# Patient Record
Sex: FEMALE | Race: OTHER | Employment: OTHER | URBAN - METROPOLITAN AREA
[De-identification: names, ages, dates, MRNs, and addresses within clinical notes are randomized per-mention and may not be internally consistent; named-entity substitution may affect disease eponyms.]

---

## 2022-07-26 ENCOUNTER — COMPREHENSIVE EXAM (OUTPATIENT)
Dept: URBAN - METROPOLITAN AREA CLINIC 16 | Facility: CLINIC | Age: 57
End: 2022-07-26

## 2022-07-26 ENCOUNTER — PREPPED CHART (OUTPATIENT)
Dept: URBAN - METROPOLITAN AREA CLINIC 16 | Facility: CLINIC | Age: 57
End: 2022-07-26

## 2022-07-26 DIAGNOSIS — H00.025: ICD-10-CM

## 2022-07-26 DIAGNOSIS — H04.123: ICD-10-CM

## 2022-07-26 DIAGNOSIS — H00.14: ICD-10-CM

## 2022-07-26 PROCEDURE — 99213 OFFICE O/P EST LOW 20 MIN: CPT

## 2022-07-26 RX ORDER — TOBRAMYCIN AND DEXAMETHASONE 3; 1 MG/G; MG/G: 1/2 OINTMENT OPHTHALMIC

## 2022-07-26 RX ORDER — CEPHALEXIN 500 MG/1: 1 CAPSULE ORAL TWICE A DAY

## 2022-07-26 ASSESSMENT — KERATOMETRY
OS_K2POWER_DIOPTERS: 45.00
OS_AXISANGLE_DEGREES: 140
OS_AXISANGLE2_DEGREES: 50
OD_AXISANGLE_DEGREES: 12
OD_AXISANGLE2_DEGREES: 102
OD_K1POWER_DIOPTERS: 44.75
OS_K1POWER_DIOPTERS: 45.75
OD_K2POWER_DIOPTERS: 44.50

## 2022-07-26 ASSESSMENT — VISUAL ACUITY
OD_SC: 20/20-1
OU_SC: 20/20
OS_SC: 20/25-1

## 2022-07-26 ASSESSMENT — TONOMETRY
OD_IOP_MMHG: 20
OS_IOP_MMHG: 18

## 2022-08-09 ENCOUNTER — COMPREHENSIVE EXAM (OUTPATIENT)
Dept: URBAN - METROPOLITAN AREA CLINIC 16 | Facility: CLINIC | Age: 57
End: 2022-08-09

## 2022-08-09 DIAGNOSIS — H00.14: ICD-10-CM

## 2022-08-09 DIAGNOSIS — H04.123: ICD-10-CM

## 2022-08-09 DIAGNOSIS — H52.4: ICD-10-CM

## 2022-08-09 DIAGNOSIS — H40.013: ICD-10-CM

## 2022-08-09 PROCEDURE — 92133 CPTRZD OPH DX IMG PST SGM ON: CPT

## 2022-08-09 PROCEDURE — 92015 DETERMINE REFRACTIVE STATE: CPT

## 2022-08-09 PROCEDURE — 92014 COMPRE OPH EXAM EST PT 1/>: CPT

## 2022-08-09 ASSESSMENT — KERATOMETRY
OS_AXISANGLE_DEGREES: 140
OS_AXISANGLE2_DEGREES: 50
OD_AXISANGLE_DEGREES: 26
OS_K1POWER_DIOPTERS: 45.75
OS_K2POWER_DIOPTERS: 44.75
OD_K2POWER_DIOPTERS: 44.50
OD_AXISANGLE2_DEGREES: 116
OD_K1POWER_DIOPTERS: 45.00

## 2022-08-09 ASSESSMENT — TONOMETRY
OS_IOP_MMHG: 18
OD_IOP_MMHG: 19
OS_IOP_MMHG: 19
OD_IOP_MMHG: 21

## 2022-08-09 ASSESSMENT — VISUAL ACUITY
OU_SC: 20/20
OS_SC: 20/25
OD_SC: 20/25

## 2022-09-13 ENCOUNTER — EMERGENCY VISIT (OUTPATIENT)
Dept: URBAN - METROPOLITAN AREA CLINIC 6 | Facility: CLINIC | Age: 57
End: 2022-09-13

## 2022-09-13 DIAGNOSIS — H04.123: ICD-10-CM

## 2022-09-13 DIAGNOSIS — H00.14: ICD-10-CM

## 2022-09-13 PROCEDURE — 92012 INTRM OPH EXAM EST PATIENT: CPT

## 2022-09-13 ASSESSMENT — KERATOMETRY
OS_AXISANGLE_DEGREES: 140
OD_AXISANGLE_DEGREES: 26
OS_K1POWER_DIOPTERS: 45.75
OD_K2POWER_DIOPTERS: 44.50
OS_AXISANGLE2_DEGREES: 50
OD_AXISANGLE2_DEGREES: 116
OD_K1POWER_DIOPTERS: 45.00
OS_K2POWER_DIOPTERS: 44.75

## 2022-09-13 ASSESSMENT — VISUAL ACUITY
OD_CC: 20/20
OS_CC: 20/25-2

## 2022-09-13 ASSESSMENT — TONOMETRY
OD_IOP_MMHG: 12
OS_IOP_MMHG: 18

## 2022-10-04 NOTE — PATIENT DISCUSSION
Discussed optional DVO rx.  Patient has always been content with uncorrected distance but as of late has noted the crawl isn't clear on the tv screen.  Discussed option of filling the rx for tv and prn.

## 2022-10-04 NOTE — PATIENT DISCUSSION
*** Patient had a stroke 2/2022 and was evaluated at [facility] and Ayan Costello 73 OCT were unremarkable.  24-2 VF was extremely unreliable so unable to determine whether potential field loss was legit or simply due to the poor test taking.  ***.

## 2023-08-15 ENCOUNTER — COMPREHENSIVE EXAM (OUTPATIENT)
Dept: URBAN - METROPOLITAN AREA CLINIC 16 | Facility: CLINIC | Age: 58
End: 2023-08-15

## 2023-08-15 DIAGNOSIS — H52.4: ICD-10-CM

## 2023-08-15 DIAGNOSIS — H40.013: ICD-10-CM

## 2023-08-15 PROCEDURE — 92133 CPTRZD OPH DX IMG PST SGM ON: CPT

## 2023-08-15 PROCEDURE — 92014 COMPRE OPH EXAM EST PT 1/>: CPT

## 2023-08-15 PROCEDURE — 92015 DETERMINE REFRACTIVE STATE: CPT

## 2023-08-15 RX ORDER — CARBOXYMETHYLCELLULOSE SODIUM 10 MG/ML: GEL OPHTHALMIC EVERY EVENING

## 2023-08-15 ASSESSMENT — KERATOMETRY
OS_K2POWER_DIOPTERS: 44.00
OS_AXISANGLE_DEGREES: 140
OS_AXISANGLE_DEGREES: 143
OS_AXISANGLE2_DEGREES: 53
OD_K1POWER_DIOPTERS: 44.75
OD_K1POWER_DIOPTERS: 45.00
OD_K2POWER_DIOPTERS: 44.50
OS_AXISANGLE2_DEGREES: 50
OD_AXISANGLE2_DEGREES: 115
OD_AXISANGLE_DEGREES: 26
OD_K2POWER_DIOPTERS: 44.25
OD_AXISANGLE2_DEGREES: 116
OS_K1POWER_DIOPTERS: 45.75
OS_K1POWER_DIOPTERS: 44.50
OD_AXISANGLE_DEGREES: 25
OS_K2POWER_DIOPTERS: 44.75

## 2023-08-15 ASSESSMENT — VISUAL ACUITY
OS_CC: 20/25
OU_SC: 20/40
OU_CC: 20/20
OD_CC: 20/20
OD_SC: 20/40
OS_SC: 20/40
OU_SC: 20/400
OU_CC: 20/25

## 2023-08-15 ASSESSMENT — TONOMETRY
OS_IOP_MMHG: 17
OD_IOP_MMHG: 16
OS_IOP_MMHG: 16
OD_IOP_MMHG: 16

## 2023-12-20 ENCOUNTER — EMERGENCY VISIT (OUTPATIENT)
Dept: URBAN - METROPOLITAN AREA CLINIC 16 | Facility: CLINIC | Age: 58
End: 2023-12-20

## 2023-12-20 DIAGNOSIS — H04.123: ICD-10-CM

## 2023-12-20 DIAGNOSIS — H00.12: ICD-10-CM

## 2023-12-20 PROCEDURE — 99214 OFFICE O/P EST MOD 30 MIN: CPT

## 2023-12-20 ASSESSMENT — TONOMETRY
OS_IOP_MMHG: 9
OD_IOP_MMHG: 13

## 2023-12-20 ASSESSMENT — KERATOMETRY
OD_AXISANGLE2_DEGREES: 116
OD_K2POWER_DIOPTERS: 44.25
OD_K1POWER_DIOPTERS: 45.00
OD_K2POWER_DIOPTERS: 44.50
OS_K1POWER_DIOPTERS: 44.50
OD_AXISANGLE2_DEGREES: 115
OS_K2POWER_DIOPTERS: 44.75
OD_K1POWER_DIOPTERS: 44.75
OS_AXISANGLE_DEGREES: 143
OS_AXISANGLE2_DEGREES: 50
OS_K2POWER_DIOPTERS: 44.00
OD_AXISANGLE_DEGREES: 25
OS_AXISANGLE2_DEGREES: 53
OS_K1POWER_DIOPTERS: 45.75
OD_AXISANGLE_DEGREES: 26
OS_AXISANGLE_DEGREES: 140

## 2023-12-20 ASSESSMENT — VISUAL ACUITY
OU_CC: 20/20
OD_CC: 20/20
OS_CC: 20/20